# Patient Record
Sex: FEMALE | Race: WHITE | Employment: STUDENT | URBAN - METROPOLITAN AREA
[De-identification: names, ages, dates, MRNs, and addresses within clinical notes are randomized per-mention and may not be internally consistent; named-entity substitution may affect disease eponyms.]

---
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
## 2018-06-23 ENCOUNTER — APPOINTMENT (OUTPATIENT)
Dept: GENERAL RADIOLOGY | Facility: HOSPITAL | Age: 18
End: 2018-06-23
Attending: PHYSICIAN ASSISTANT
Payer: COMMERCIAL

## 2018-06-23 ENCOUNTER — HOSPITAL ENCOUNTER (EMERGENCY)
Facility: HOSPITAL | Age: 18
Discharge: HOME | End: 2018-06-23
Attending: PHYSICIAN ASSISTANT
Payer: COMMERCIAL

## 2018-06-23 VITALS
HEART RATE: 82 BPM | DIASTOLIC BLOOD PRESSURE: 65 MMHG | HEIGHT: 70 IN | SYSTOLIC BLOOD PRESSURE: 105 MMHG | OXYGEN SATURATION: 97 % | WEIGHT: 140 LBS | RESPIRATION RATE: 20 BRPM | TEMPERATURE: 98 F | BODY MASS INDEX: 20.04 KG/M2

## 2018-06-23 DIAGNOSIS — S16.1XXA STRAIN OF NECK MUSCLE, INITIAL ENCOUNTER: Primary | ICD-10-CM

## 2018-06-23 DIAGNOSIS — S60.211A CONTUSION OF RIGHT WRIST, INITIAL ENCOUNTER: ICD-10-CM

## 2018-06-23 PROCEDURE — 99284 EMERGENCY DEPT VISIT MOD MDM: CPT

## 2018-06-23 PROCEDURE — 72040 X-RAY EXAM NECK SPINE 2-3 VW: CPT | Performed by: PHYSICIAN ASSISTANT

## 2018-06-23 PROCEDURE — 73110 X-RAY EXAM OF WRIST: CPT | Performed by: PHYSICIAN ASSISTANT

## 2018-06-23 RX ORDER — IBUPROFEN 600 MG/1
TABLET ORAL
Qty: 20 TABLET | Refills: 0 | Status: SHIPPED | OUTPATIENT
Start: 2018-06-23 | End: 2020-05-29
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
## 2018-08-07 ENCOUNTER — HOSPITAL ENCOUNTER (OUTPATIENT)
Age: 18
Discharge: HOME | End: 2018-08-07
Attending: EMERGENCY MEDICINE
Payer: COMMERCIAL

## 2018-08-07 VITALS
OXYGEN SATURATION: 99 % | HEART RATE: 56 BPM | HEIGHT: 70 IN | WEIGHT: 130 LBS | BODY MASS INDEX: 18.61 KG/M2 | RESPIRATION RATE: 16 BRPM | DIASTOLIC BLOOD PRESSURE: 61 MMHG | SYSTOLIC BLOOD PRESSURE: 113 MMHG | TEMPERATURE: 99 F

## 2018-08-07 DIAGNOSIS — N39.0 URINARY TRACT INFECTION WITHOUT HEMATURIA, SITE UNSPECIFIED: Primary | ICD-10-CM

## 2018-08-07 LAB
B-HCG UR QL: NEGATIVE
GLUCOSE UR STRIP-MCNC: 100 MG/DL
KETONES UR STRIP-MCNC: NEGATIVE MG/DL
NITRITE UR QL STRIP: POSITIVE
PH UR STRIP: 5 [PH]
PROT UR STRIP-MCNC: 30 MG/DL
SP GR UR STRIP: 1.01
UROBILINOGEN UR STRIP-ACNC: 2 MG/DL

## 2018-08-07 PROCEDURE — 99214 OFFICE O/P EST MOD 30 MIN: CPT

## 2018-08-07 PROCEDURE — 81003 URINALYSIS AUTO W/O SCOPE: CPT

## 2018-08-07 PROCEDURE — 87086 URINE CULTURE/COLONY COUNT: CPT | Performed by: EMERGENCY MEDICINE

## 2018-08-07 PROCEDURE — 81025 URINE PREGNANCY TEST: CPT

## 2018-08-07 RX ORDER — CEPHALEXIN 500 MG/1
500 CAPSULE ORAL 3 TIMES DAILY
Qty: 21 CAPSULE | Refills: 0 | Status: SHIPPED | OUTPATIENT
Start: 2018-08-07 | End: 2018-08-14

## 2018-08-07 NOTE — ED PROVIDER NOTES
[facility]    History   Patient presents with:  Urinary Symptoms (urologic)    Stated Complaint: poss uti    HPI    21year old female complains of urinary frequency with dysuria for the past day, and patie vital signs reviewed. All other systems reviewed and negative except as noted above. PSFH elements reviewed from today and agreed except as otherwise stated in HPI.     Physical Exam   ED Triage Vitals [08/07/18 1019]  BP: 113/61  Pulse: 56  Resp: 1 following:        Result Value    Urine Color Orange (*)     Urine Clarity Cloudy (*)     Protein urine 30  (*)     Glucose, Urine 100  (*)     Bilirubin, Urine Small (*)     Blood, Urine Large (*)     Nitrite Urine Positive (*)     Urobilinogen urine 2.0 possible for a visit         Medications Prescribed:  Current Discharge Medication List    START taking these medications    cephALEXin (KEFLEX) 500 MG Oral Cap  Take 1 capsule (500 mg total) by mouth 3 (three) times daily.   Qty: 21 capsule Refills: 0

## 2018-08-07 NOTE — ED INITIAL ASSESSMENT (HPI)
REPORTS DYSURIA AND URINARY FREQUENCY SINCE YESTERDAY. DENIES FEVERS. DENIES FLANK PAIN. TAKING AZO AT HOME FOR SYMPTOMS.

## 2019-08-23 PROCEDURE — 81003 URINALYSIS AUTO W/O SCOPE: CPT | Performed by: INTERNAL MEDICINE

## 2019-11-22 ENCOUNTER — HOSPITAL ENCOUNTER (OUTPATIENT)
Age: 19
Discharge: HOME | End: 2019-11-22
Attending: EMERGENCY MEDICINE
Payer: COMMERCIAL

## 2019-11-22 VITALS
OXYGEN SATURATION: 100 % | RESPIRATION RATE: 16 BRPM | SYSTOLIC BLOOD PRESSURE: 132 MMHG | HEIGHT: 72 IN | TEMPERATURE: 98 F | WEIGHT: 130 LBS | HEART RATE: 50 BPM | DIASTOLIC BLOOD PRESSURE: 85 MMHG | BODY MASS INDEX: 17.61 KG/M2

## 2019-11-22 DIAGNOSIS — N30.01 ACUTE CYSTITIS WITH HEMATURIA: Primary | ICD-10-CM

## 2019-11-22 PROCEDURE — 87077 CULTURE AEROBIC IDENTIFY: CPT | Performed by: EMERGENCY MEDICINE

## 2019-11-22 PROCEDURE — 87186 SC STD MICRODIL/AGAR DIL: CPT | Performed by: EMERGENCY MEDICINE

## 2019-11-22 PROCEDURE — 99214 OFFICE O/P EST MOD 30 MIN: CPT

## 2019-11-22 PROCEDURE — 87086 URINE CULTURE/COLONY COUNT: CPT | Performed by: EMERGENCY MEDICINE

## 2019-11-22 PROCEDURE — 81002 URINALYSIS NONAUTO W/O SCOPE: CPT | Performed by: EMERGENCY MEDICINE

## 2019-11-22 PROCEDURE — 81025 URINE PREGNANCY TEST: CPT | Performed by: EMERGENCY MEDICINE

## 2019-11-22 RX ORDER — PHENAZOPYRIDINE HYDROCHLORIDE 200 MG/1
200 TABLET, FILM COATED ORAL 3 TIMES DAILY PRN
Qty: 6 TABLET | Refills: 0 | Status: SHIPPED | OUTPATIENT
Start: 2019-11-22 | End: 2019-11-29

## 2019-11-22 RX ORDER — CEPHALEXIN 500 MG/1
500 CAPSULE ORAL 4 TIMES DAILY
Qty: 40 CAPSULE | Refills: 0 | Status: SHIPPED | OUTPATIENT
Start: 2019-11-22 | End: 2019-12-02

## 2019-11-22 RX ORDER — PHENAZOPYRIDINE HYDROCHLORIDE 200 MG/1
200 TABLET, FILM COATED ORAL ONCE
Status: COMPLETED | OUTPATIENT
Start: 2019-11-22 | End: 2019-11-22

## 2019-11-22 NOTE — ED INITIAL ASSESSMENT (HPI)
For the past 1 day, patient complains of increased urinary frequency, hematuria, urgency and dysuria. Denies fevers and back pain. Has chronic UTI's.

## 2019-11-22 NOTE — ED PROVIDER NOTES
Patient Seen in: [facility]      History   Patient presents with:  Urinary Symptoms (urologic)    Stated Complaint: urinary burning & frequency    HPI    53-year-old white female presents to the immediate care today for com palpation there is no rebound or guarding noted no hepatosplenomegaly is noted. No masses are noted. No hernias are palpated. Extremity exam reveals no clubbing cyanosis or edema.   Patient has good radial pulses noted bilaterally in the upper extremit

## 2019-11-26 NOTE — ED NOTES
Attempted to contact the patient regarding urine culture results but was unsuccessful. Unable to leave a message.

## 2019-11-27 NOTE — ED NOTES
Letter mailed to patient's home as we have been unable to get in touch with her regarding her urine culture results.

## 2020-11-19 PROBLEM — K59.04 CHRONIC IDIOPATHIC CONSTIPATION: Status: RESOLVED | Noted: 2017-11-24 | Resolved: 2020-11-19

## 2022-09-10 ENCOUNTER — WALK IN (OUTPATIENT)
Dept: URGENT CARE | Age: 22
End: 2022-09-10

## 2022-09-10 VITALS
OXYGEN SATURATION: 98 % | RESPIRATION RATE: 14 BRPM | BODY MASS INDEX: 18.2 KG/M2 | WEIGHT: 130 LBS | DIASTOLIC BLOOD PRESSURE: 77 MMHG | TEMPERATURE: 98.6 F | SYSTOLIC BLOOD PRESSURE: 108 MMHG | HEART RATE: 58 BPM | HEIGHT: 71 IN

## 2022-09-10 DIAGNOSIS — R10.84 GENERALIZED ABDOMINAL PAIN: Primary | ICD-10-CM

## 2022-09-10 DIAGNOSIS — R39.9 UTI SYMPTOMS: ICD-10-CM

## 2022-09-10 LAB
APPEARANCE, POC: CLEAR
B-HCG UR QL: NEGATIVE
BILIRUBIN, POC: NEGATIVE
COLOR, POC: YELLOW
GLUCOSE UR-MCNC: NEGATIVE MG/DL
INTERNAL PROCEDURAL CONTROLS ACCEPTABLE: YES
KETONES, POC: NEGATIVE MG/DL
NITRITE, POC: NEGATIVE
OCCULT BLOOD, POC: ABNORMAL
PH UR: 5 [PH] (ref 5–7)
PROT UR-MCNC: NEGATIVE MG/DL
SP GR UR: 1.01 (ref 1–1.03)
UROBILINOGEN UR-MCNC: 0.2 MG/DL (ref 0–1)
WBC (LEUKOCYTE) ESTERASE, POC: ABNORMAL

## 2022-09-10 PROCEDURE — 81002 URINALYSIS NONAUTO W/O SCOPE: CPT | Performed by: NURSE PRACTITIONER

## 2022-09-10 PROCEDURE — 99213 OFFICE O/P EST LOW 20 MIN: CPT | Performed by: NURSE PRACTITIONER

## 2022-09-10 PROCEDURE — 87086 URINE CULTURE/COLONY COUNT: CPT | Performed by: INTERNAL MEDICINE

## 2022-09-10 PROCEDURE — 81025 URINE PREGNANCY TEST: CPT | Performed by: NURSE PRACTITIONER

## 2022-09-10 RX ORDER — NORGESTIMATE AND ETHINYL ESTRADIOL 0.25-0.035
1 KIT ORAL DAILY
COMMUNITY
Start: 2022-07-15

## 2022-09-10 RX ORDER — NITROFURANTOIN 25; 75 MG/1; MG/1
100 CAPSULE ORAL
COMMUNITY
Start: 2022-04-27

## 2022-09-10 RX ORDER — CLINDAMYCIN PHOSPHATE 10 MG/G
1 GEL TOPICAL PRN
COMMUNITY
Start: 2022-06-15

## 2022-09-10 RX ORDER — SPIRONOLACTONE 100 MG/1
TABLET, FILM COATED ORAL
COMMUNITY
Start: 2022-09-07

## 2022-09-10 RX ORDER — CLINDAMYCIN PHOSPHATE 10 MG/G
1 GEL TOPICAL PRN
COMMUNITY
Start: 2022-06-24

## 2022-09-10 ASSESSMENT — ENCOUNTER SYMPTOMS
FEVER: 0
ABDOMINAL DISTENTION: 0
BLOOD IN STOOL: 0
NAUSEA: 0
RECTAL PAIN: 0
CONSTIPATION: 0
CHILLS: 0
APPETITE CHANGE: 1
VOMITING: 0
ACTIVITY CHANGE: 0
SHORTNESS OF BREATH: 0
ANAL BLEEDING: 0
DIARRHEA: 0
ABDOMINAL PAIN: 1
FATIGUE: 1
DIZZINESS: 0
HEADACHES: 0

## 2022-09-11 LAB — BACTERIA UR CULT: NORMAL

## (undated) NOTE — ED AVS SNAPSHOT
Bradly Alexandre   MRN: [id number]    Department:  [facility] Emergency Department   Date of Visit:  6/23/2018           Disclosure     Insurance plans vary and the physician(s) referred by the ER may not be covered by your plan.  Please contac CARE PHYSICIAN AT ONCE OR RETURN IMMEDIATELY TO THE EMERGENCY DEPARTMENT. If you have been prescribed any medication(s), please fill your prescription right away and begin taking the medication(s) as directed.   If you believe that any of the medications

## (undated) NOTE — MR AVS SNAPSHOT
[facility] [place]  [address] [facility] [address]  [phone]               Thank you for choosing us for your health care visit with TOMMY Miranda. We are glad to serve you and happy to provide you with this summary of your visit.   Ple Diabetes mellitus, type 2 Adults with no symptoms who are overweight or obese and have 1 or more other risk factors for diabetes At least every 3 years   Gonorrhea Sexually active women at increased risk for infection At routine exams   Hepatitis C Anyone Pneumococcal conjugate vaccine (PCV13) and pneumococcal polysaccharide vaccine (PPSV23) Women at increased risk for infection – talk with your healthcare provider PCV13: 1 dose ages 23 to 72 (protects against 13 types of pneumococcal bacteria)  PPSV23: 1 t substitute for professional medical care. Always follow your healthcare professional's instructions. Follow Up with Our Office     Return if symptoms worsen or fail to improve.       Your Appointments     Jan 26, 2017  5:15 PM   Dmg Mri with GE

## (undated) NOTE — ED AVS SNAPSHOT
[facility]  [address]    [address] [place] [address]    Phone:  [phone]    Fax:  [phone]           Dionicio Acharya   MRN: [id number]    Department:  [facility]   Date of Visit: Rx Macrobid 100 mg twice daily X 7 days - take daily probiotics to avoid any GI distress with the antibiotic.    If symptoms of pyelonephritis occur - nausea, vomiting, flank pain, fever and chills please return immediately / go to the ER    Avoid holding u this physician (or your personal doctor if your instructions are to return to your personal doctor) about any new or lasting problems. The primary care or specialist physician will see patients referred from the [facility].  Follow-up care is at you to explore options for quitting.     - If you have concerns related to behavioral health issues or thoughts of harming yourself, contact [facility] at [phone].     - If you don’t have insurance, Kemar Britton

## (undated) NOTE — LETTER
Date & Time: 11/27/2019, 9:25 AM  Patient: Josi Beck    Dear Josi Beck,    We have received test results pertaining to your visit to [facility] on 11/22/2019 and have not been able to contact you at your telephone number [phone]